# Patient Record
Sex: FEMALE | Race: WHITE | URBAN - METROPOLITAN AREA
[De-identification: names, ages, dates, MRNs, and addresses within clinical notes are randomized per-mention and may not be internally consistent; named-entity substitution may affect disease eponyms.]

---

## 2019-08-30 ENCOUNTER — OFFICE VISIT (OUTPATIENT)
Dept: SURGERY | Age: 59
End: 2019-08-30

## 2019-08-30 VITALS
DIASTOLIC BLOOD PRESSURE: 84 MMHG | WEIGHT: 123 LBS | SYSTOLIC BLOOD PRESSURE: 139 MMHG | HEIGHT: 66 IN | BODY MASS INDEX: 19.77 KG/M2 | HEART RATE: 90 BPM

## 2019-08-30 DIAGNOSIS — Z80.3 FAMILY HISTORY OF BREAST CANCER: ICD-10-CM

## 2019-08-30 DIAGNOSIS — N60.11 FIBROCYSTIC BREAST CHANGES OF BOTH BREASTS: Primary | ICD-10-CM

## 2019-08-30 DIAGNOSIS — N60.12 FIBROCYSTIC BREAST CHANGES OF BOTH BREASTS: Primary | ICD-10-CM

## 2019-08-30 RX ORDER — FOSINOPIRL SODIUM 10 MG/1
TABLET ORAL
Refills: 2 | COMMUNITY
Start: 2019-07-02

## 2019-08-30 NOTE — PROGRESS NOTES
HISTORY OF PRESENT ILLNESS  Sandrine Hall is a 64 y.o. female. HPI  NEW patient consult referred by  Dr. Laecy Weldon  for high risk for breast cancer. Denies any breast lumps, skin changes, or nipple discharge/retraction. No pain. Patient has dense breasts. Family History: Mother, breast cancer, 66, survivor  Lifetime risk calculated at breast imaging at [facility] - 20.5%. Recalculated here with same result. OB History        1    Para        Term                AB        Living           SAB        TAB        Ectopic        Molar        Multiple        Live Births              Obstetric Comments   Menarche 17, LMP 48, # of children 1, age of 4st delivery 47, Hysterectomy/oophorectomy no/no, Breast bx no, history of breast feeding no, BCP yes, Hormone therapy no           Mammogram, 20, [facility], BIRADS 1    Review of Systems   All other systems reviewed and are negative. Physical Exam   Constitutional: She appears well-developed and well-nourished. Pulmonary/Chest: Right breast exhibits no inverted nipple, no mass, no nipple discharge, no skin change and no tenderness. Left breast exhibits no inverted nipple, no mass, no nipple discharge, no skin change and no tenderness. Breasts are symmetrical.   Musculoskeletal: Normal range of motion. UE x 2   Lymphadenopathy:     She has no axillary adenopathy. Right: No supraclavicular adenopathy present. Left: No supraclavicular adenopathy present. Skin: Skin is warm, dry and intact. Chest and breasts examined   Psychiatric: She has a normal mood and affect. Her speech is normal and behavior is normal.     Visit Vitals  /84   Pulse 90   Ht 5' 6\" (1.676 m)   Wt 123 lb (55.8 kg)   BMI 19.85 kg/m²     ASSESSMENT and PLAN  Encounter Diagnoses   Name Primary?  Fibrocystic breast changes of both breasts Yes    Family history of breast cancer      Normal exam and imaging with no evidence of breast malignancy.   Discussed different types of breast imaging and the information they provide - 2D mammogram, 3D mammogram, breast US and breast MRI. Aware that she qualifies for breast MRI with a risk above 20%. Discussed pros and cons of breast MRI - not affected by breast density, use of contrast and higher rate of false positives. She will continue with annual [facility]D [facility]ograms at [facility]. Use of additional breast imaging will be done PRN. Answered questions about genetics and breast cancer and genetic testing. We will not pursue at this time as she only has one relative with a history of breast cancer. RTC PRN. Aware that she should update us if there is a change in her family history. She is comfortable with this plan. All questions answered and she stated understanding. Greater than 30 minutes was spent with this patient and greater than 50% of that time was spent in face to face counseling.

## 2019-08-30 NOTE — LETTER
8/30/2019 10:39 AM 
 
Patient:  Fide Kulkarni YOB: 1960 Date of Visit: 8/30/2019 Dear Carolann Burdick MD 
[address] [address] [facility] [address] VIA Facsimile: [phone] 
 : Thank you for referring Ms. Karena Davila to me for evaluation/treatment. Below are the relevant portions of my assessment and plan of care. ASSESSMENT and PLAN Encounter Diagnoses Name Primary?  Fibrocystic breast changes of both breasts Yes  Family history of breast cancer Normal exam and imaging with no evidence of breast malignancy. Discussed different types of breast imaging and the information they provide - 2D mammogram, 3D mammogram, breast US and breast MRI. Aware that she qualifies for breast MRI with a risk above 20%. Discussed pros and cons of breast MRI - not affected by breast density, use of contrast and higher rate of false positives. She will continue with annual 3D mammograms at [address] Use of additional breast imaging will be done PRN. Answered questions about genetics and breast cancer and genetic testing. We will not pursue at this time as she only has one relative with a history of breast cancer. RTC PRN. Aware that she should update us if there is a change in her family history. She is comfortable with this plan. All questions answered and she stated understanding. If you have questions, please do not hesitate to call me. I look forward to following Ms. Merritt Boland along with you. Sincerely, Tommy Leon NP